# Patient Record
Sex: MALE | Race: WHITE | Employment: FULL TIME | URBAN - METROPOLITAN AREA
[De-identification: names, ages, dates, MRNs, and addresses within clinical notes are randomized per-mention and may not be internally consistent; named-entity substitution may affect disease eponyms.]

---

## 2017-01-04 ENCOUNTER — OFFICE VISIT (OUTPATIENT)
Dept: DERMATOLOGY | Age: 47
End: 2017-01-04

## 2017-01-04 DIAGNOSIS — L71.9 ACNE ROSACEA: Primary | ICD-10-CM

## 2017-01-04 DIAGNOSIS — L30.8 OTHER ECZEMA: ICD-10-CM

## 2017-01-04 PROCEDURE — 99213 OFFICE O/P EST LOW 20 MIN: CPT | Performed by: DERMATOLOGY

## 2017-01-04 RX ORDER — METRONIDAZOLE 7.5 MG/G
GEL TOPICAL
Qty: 60 G | Refills: 6 | Status: SHIPPED | OUTPATIENT
Start: 2017-01-04 | End: 2017-11-13 | Stop reason: SDUPTHER

## 2017-03-08 ENCOUNTER — OFFICE VISIT (OUTPATIENT)
Dept: DERMATOLOGY | Age: 47
End: 2017-03-08

## 2017-03-08 DIAGNOSIS — L71.9 ACNE ROSACEA: Primary | ICD-10-CM

## 2017-03-08 PROCEDURE — 99213 OFFICE O/P EST LOW 20 MIN: CPT | Performed by: DERMATOLOGY

## 2017-03-08 RX ORDER — DOXYCYCLINE 100 MG/1
TABLET ORAL
Qty: 60 TABLET | Refills: 3 | Status: SHIPPED | OUTPATIENT
Start: 2017-03-08 | End: 2017-06-21 | Stop reason: SDUPTHER

## 2017-05-08 ENCOUNTER — TELEPHONE (OUTPATIENT)
Dept: DERMATOLOGY | Age: 47
End: 2017-05-08

## 2017-06-21 ENCOUNTER — OFFICE VISIT (OUTPATIENT)
Dept: DERMATOLOGY | Age: 47
End: 2017-06-21

## 2017-06-21 DIAGNOSIS — L30.8 OTHER ECZEMA: ICD-10-CM

## 2017-06-21 DIAGNOSIS — L71.9 ACNE ROSACEA: Primary | ICD-10-CM

## 2017-06-21 PROCEDURE — 99213 OFFICE O/P EST LOW 20 MIN: CPT | Performed by: DERMATOLOGY

## 2017-06-21 RX ORDER — DOXYCYCLINE 100 MG/1
TABLET ORAL
Qty: 60 TABLET | Refills: 3 | Status: SHIPPED | OUTPATIENT
Start: 2017-06-21 | End: 2017-11-13 | Stop reason: SDUPTHER

## 2017-07-17 ENCOUNTER — PATIENT MESSAGE (OUTPATIENT)
Dept: FAMILY MEDICINE CLINIC | Age: 47
End: 2017-07-17

## 2017-07-17 DIAGNOSIS — R89.4 POSITIVE TEST FOR HERPES SIMPLEX VIRUS (HSV) ANTIBODY: ICD-10-CM

## 2017-07-18 RX ORDER — VALACYCLOVIR HYDROCHLORIDE 500 MG/1
TABLET, FILM COATED ORAL
Qty: 90 TABLET | Refills: 3 | Status: SHIPPED | OUTPATIENT
Start: 2017-07-18 | End: 2018-06-20 | Stop reason: SDUPTHER

## 2017-11-13 ENCOUNTER — OFFICE VISIT (OUTPATIENT)
Dept: DERMATOLOGY | Age: 47
End: 2017-11-13

## 2017-11-13 DIAGNOSIS — L71.9 ACNE ROSACEA: Primary | ICD-10-CM

## 2017-11-13 PROCEDURE — 99213 OFFICE O/P EST LOW 20 MIN: CPT | Performed by: DERMATOLOGY

## 2017-11-13 RX ORDER — METRONIDAZOLE 7.5 MG/G
GEL TOPICAL
Qty: 60 G | Refills: 6 | Status: SHIPPED | OUTPATIENT
Start: 2017-11-13 | End: 2018-11-14 | Stop reason: SDUPTHER

## 2017-11-13 RX ORDER — DOXYCYCLINE 100 MG/1
TABLET ORAL
Qty: 60 TABLET | Refills: 3 | Status: SHIPPED | OUTPATIENT
Start: 2017-11-13 | End: 2018-02-27 | Stop reason: SDUPTHER

## 2017-11-13 NOTE — PROGRESS NOTES
NAD  Well-developed well-nourished. Few inflammatory papules on his chin with background xerosis. Remainder of his face clear      Assessment and Plan     1. Acne rosacea -Doing well-continue doxycycline 100 mg p.o. q. day and MetroGel 0.75% b.i.d. Will plan to see him back in 6 months. If he wants to try reducing his dose prior to that appointment, he is welcome to do so. He notes that inflammatory papules on his chin usually flare after he shaves and after he's been running outside and developed xerosis from the weather- seem to be unrelated to rosacea flares.

## 2018-02-27 RX ORDER — DOXYCYCLINE 100 MG/1
TABLET ORAL
Qty: 60 TABLET | Refills: 3 | Status: SHIPPED | OUTPATIENT
Start: 2018-02-27 | End: 2018-11-14

## 2018-05-14 ENCOUNTER — OFFICE VISIT (OUTPATIENT)
Dept: DERMATOLOGY | Age: 48
End: 2018-05-14

## 2018-05-14 DIAGNOSIS — L71.9 ACNE ROSACEA: Primary | ICD-10-CM

## 2018-05-14 PROCEDURE — 99213 OFFICE O/P EST LOW 20 MIN: CPT | Performed by: DERMATOLOGY

## 2018-06-20 DIAGNOSIS — R89.4 POSITIVE TEST FOR HERPES SIMPLEX VIRUS (HSV) ANTIBODY: ICD-10-CM

## 2018-06-22 RX ORDER — VALACYCLOVIR HYDROCHLORIDE 500 MG/1
TABLET, FILM COATED ORAL
Qty: 90 TABLET | Refills: 0 | Status: SHIPPED | OUTPATIENT
Start: 2018-06-22 | End: 2018-09-13 | Stop reason: SDUPTHER

## 2018-09-15 DIAGNOSIS — Z13.220 LIPID SCREENING: ICD-10-CM

## 2018-09-15 DIAGNOSIS — R89.4 POSITIVE TEST FOR HERPES SIMPLEX VIRUS (HSV) ANTIBODY: Primary | ICD-10-CM

## 2018-11-14 ENCOUNTER — OFFICE VISIT (OUTPATIENT)
Dept: DERMATOLOGY | Age: 48
End: 2018-11-14
Payer: COMMERCIAL

## 2018-11-14 DIAGNOSIS — D22.9 BENIGN NEVUS: ICD-10-CM

## 2018-11-14 DIAGNOSIS — L30.8 OTHER ECZEMA: ICD-10-CM

## 2018-11-14 DIAGNOSIS — L71.9 ACNE ROSACEA: Primary | ICD-10-CM

## 2018-11-14 PROCEDURE — 99214 OFFICE O/P EST MOD 30 MIN: CPT | Performed by: DERMATOLOGY

## 2018-11-14 RX ORDER — METRONIDAZOLE 7.5 MG/G
GEL TOPICAL
Qty: 60 G | Refills: 11 | Status: SHIPPED | OUTPATIENT
Start: 2018-11-14 | End: 2019-06-05

## 2018-11-14 RX ORDER — DOXYCYCLINE 50 MG/1
TABLET ORAL
Qty: 60 TABLET | Refills: 11 | Status: SHIPPED | OUTPATIENT
Start: 2018-11-14 | End: 2020-05-11

## 2018-12-05 DIAGNOSIS — R89.4 POSITIVE TEST FOR HERPES SIMPLEX VIRUS (HSV) ANTIBODY: ICD-10-CM

## 2018-12-06 RX ORDER — VALACYCLOVIR HYDROCHLORIDE 500 MG/1
TABLET, FILM COATED ORAL
Qty: 90 TABLET | Refills: 0 | Status: SHIPPED | OUTPATIENT
Start: 2018-12-06 | End: 2019-03-24 | Stop reason: SDUPTHER

## 2019-03-24 DIAGNOSIS — R89.4 POSITIVE TEST FOR HERPES SIMPLEX VIRUS (HSV) ANTIBODY: ICD-10-CM

## 2019-03-25 RX ORDER — VALACYCLOVIR HYDROCHLORIDE 500 MG/1
TABLET, FILM COATED ORAL
Qty: 30 TABLET | Refills: 0 | Status: SHIPPED | OUTPATIENT
Start: 2019-03-25 | End: 2019-05-24 | Stop reason: SDUPTHER

## 2019-05-24 DIAGNOSIS — R89.4 POSITIVE TEST FOR HERPES SIMPLEX VIRUS (HSV) ANTIBODY: ICD-10-CM

## 2019-05-24 RX ORDER — VALACYCLOVIR HYDROCHLORIDE 500 MG/1
TABLET, FILM COATED ORAL
Qty: 30 TABLET | Refills: 5 | Status: SHIPPED | OUTPATIENT
Start: 2019-05-24 | End: 2019-06-05 | Stop reason: SDUPTHER

## 2019-06-05 ENCOUNTER — OFFICE VISIT (OUTPATIENT)
Dept: FAMILY MEDICINE CLINIC | Age: 49
End: 2019-06-05
Payer: COMMERCIAL

## 2019-06-05 VITALS
OXYGEN SATURATION: 97 % | HEIGHT: 71 IN | HEART RATE: 74 BPM | WEIGHT: 171.8 LBS | RESPIRATION RATE: 19 BRPM | TEMPERATURE: 98.2 F | BODY MASS INDEX: 24.05 KG/M2 | DIASTOLIC BLOOD PRESSURE: 73 MMHG | SYSTOLIC BLOOD PRESSURE: 113 MMHG

## 2019-06-05 DIAGNOSIS — Z00.00 ROUTINE GENERAL MEDICAL EXAMINATION AT A HEALTH CARE FACILITY: Primary | ICD-10-CM

## 2019-06-05 DIAGNOSIS — R89.4 POSITIVE TEST FOR HERPES SIMPLEX VIRUS (HSV) ANTIBODY: ICD-10-CM

## 2019-06-05 PROCEDURE — 99396 PREV VISIT EST AGE 40-64: CPT | Performed by: FAMILY MEDICINE

## 2019-06-05 RX ORDER — VALACYCLOVIR HYDROCHLORIDE 500 MG/1
TABLET, FILM COATED ORAL
Qty: 90 TABLET | Refills: 3 | Status: SHIPPED | OUTPATIENT
Start: 2019-06-05 | End: 2020-05-11 | Stop reason: SDUPTHER

## 2019-06-05 RX ORDER — OMEGA-3/DHA/EPA/FISH OIL 500-1000MG
1 CAPSULE ORAL DAILY
Qty: 90 CAPSULE | Refills: 3 | COMMUNITY
Start: 2019-06-05

## 2019-06-05 NOTE — PATIENT INSTRUCTIONS
1) Make a point to f/u with your dermatologist later this year. 2) Keep up your fitness! 3) Let your PCP know if you have done an HIV screening. Review your [employer] account to update this along with your tetanus booster status. 4) You can call [employer] before your next birthday to check scheduling and insurance/cost.  Phone: [phone]  5) Complete your fasting (8-10) bloodwork in next 30 days. Frankalireza  [address] [place] [address], [address]  Phone: [phone] Fax: [phone]  Mon.-Fri. 7 a.m.-5 p.m. Sat. 8 a.m.-Noon    [facility]  [address]  [facility] Center[facility] da[facility] 70  Phone: [phone]  Mon.-Fri. 7:30 a.m.-4 p.m. 1418 [facility]  [address], 6500 [facility] [address]  Phone: [phone]  Mon.-Fri. 8 a.m.-8 p.m. Sat. 9 a.m.-5 p.m. 6900 [facility]  [address]  [facility]. [address]  Phone: [phone] Fax: [phone]  Mon.-Fri. 8 a.m.-5 p.m. [facility]  [address] [place] [address]  Phone: [phone] Fax: [phone]  Mon.-Fri. 7:30 a.m.-4 p.m. [facility]  555 [facility], [address] [place] [address], [address] [place] [address]  Phone: [phone]  Mon., Tue., Thu., Fri. 7:30 a.m.-5 p.m.  Eunice Solis. 7:30 a.m.-Noon    [facility]  [address] [facility] [address]  [address]  Phone: [phone] Fax: [phone]  Mon.-Fri. 7:30 a.m.-4 p.m. [facility]  [address]  Phone: [phone] Fax: [phone]  Mon.-Fri. 8 a.m.-4:30 p.m. 506 [facility]  [place] [address], 314 [facility], [address] [place] [address]  Phone: [phone] Fax: [phone]  Mon.-Fri. 8 a.m.-4:30 p.m.         1315 [facility]  [address] [place] [address]  [place], [address]  Phone: [phone] Fax: [phone]  Mon.-Fri. 7 a.m.-5 p.m. Sat. 8 a.m.-4 p.m. 800 [facility], [address] [facility] [address]  Phone: [phone]  Mon.-Fri. 7:30 a.m.-4 p.m. [address] [facility] [address] [facility] [address]  1139 [facility]  Phone: [phone] Fax: [phone]  Mon.-Fri. 7 a.m.-5 p.m. Sat. 8 a.m.-Noon [facility] [address], [address] [place] [address]  Phone: [phone] Fax: [phone]  Mon.-Fri. 7 a.m.-5 p.m. [facility]  315 [facility], 400 [facility]  Phone: [phone] Fax: [phone]  Mon.-Fri. 7 a.m.-5 p.m. [address]  [address]  Phone: [phone]  Mon.-Fri. 6:30 a.m.-6 p.m. Sat. 7 a.m.-1 p.m. Critical access hospital 75, [place], [facility]  Phone: [phone]  [facility] 24/7    [address], [address]  Phone: [phone]  Mon.-Fri. 6 a.m.-7 p.m. Sat. 7 a.m.-3 p.m. [facility]  [address] [facility]  Phone: [phone]  Mon.-Fri. 6 a.m.-11 p.m.  Sat.-Sun. 6:30 a.m.-11 p.m. Gaetano Ramirez and Deshaun Claros  [address] [place] [address]  Phone: [phone]  Mon.-Fri. 8 a.m.-4 p.m. 328 [facility]  Farzad Mcdaniel [address]47. [facility], [facility]  Phone: [phone]  Open 24/7    [facility]  [address], [address] [place] [facility]  Phone: [phone]  Mon.-Fri. 6:30 a.m.-6:30 p.m. Sat. 8 a.m.-Noon    1301 [facility] Freddy Mckee [facility]  Phone: [phone] Fax: [phone]  Mon.-Fri. 8:30 a.m.-5 p.m. [facility]   [place] [address], [address] [place] [address]  Phone: [phone] Fax: [phone]  Mon.-Fri. 8 a.m.-4:30 p.m. Please call ahead to check hours.

## 2019-06-05 NOTE — PROGRESS NOTES
[facility]  Progress Note  Manny Yu DO          Zeina Ceballos  1970 06/05/19    Chief Complaint:   Zeina Ceballos is a 51 y.o. male who is here for his yearly check-up    HPI:   The patient has been in otherwise good general health in the past.  Takes valacyclovir preventively. Has brief GI bug and getting better from this. His appetite is picking up today. Is taking omega-3 for the low HDL on a by biometric screen of an HDL [facility] but the lab in 2016 showing an HDL of 50s. ROS negative for headache, vision changes, chest pain, shortness of breath, abdominal pain, urinary sx, bowel changes. Past medical, surgical, and social history reviewed. Medications and allergies reviewed. No Known Allergies  Prior to Visit Medications    Medication Sig Taking? Authorizing Provider   valACYclovir (VALTREX) 500 MG tablet TAKE 1 TABLET BY MOUTH DAILY FOR VIRAL SUPPRESSION AND INCREASE TO 2 TABLETS BY MOUTH TWICE DAILY FOR 5 DAYS DURING OUTBREAK Yes Americo Mtz,    Omega-3 Fatty Acids (OMEGA 3 500) 500 MG CAPS Take 1 each by mouth daily Yes Larry Mtz DO   doxycycline (ADOXA) 50 MG tablet 2 tabs PO QD Yes Cydney Ford MD          Vitals:    06/05/19 0945   BP: 113/73   Pulse: 74   Resp: 19   Temp: 98.2 °F (36.8 °C)   TempSrc: Oral   SpO2: 97%   Weight: 171 lb 12.8 oz (77.9 kg)   Height: 5' 11\" (1.803 m)      Wt Readings from Last 3 Encounters:   06/05/19 171 lb 12.8 oz (77.9 kg)   06/20/16 174 lb 6.4 oz (79.1 kg)     BP Readings from Last 3 Encounters:   06/05/19 113/73   06/20/16 116/68       Patient Active Problem List   Diagnosis    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 No past surgical history on file.   Family History   Problem Relation Age of Onset    Heart Disease Mother         62s    Breast Cancer Mother     Diabetes Father     Heart Disease Father         62s    Kidney Disease Father         dialysis. [place]    Ulcerative Colitis Father         in mid 46s     Social History     Socioeconomic History    Marital status: Single     Spouse name: Not on file    Number of children: Not on file    Years of education: Not on file    Highest education level: Not on file   Occupational History    Not on file   Social Needs    Financial resource strain: Not on file    Food insecurity:     Worry: Not on file     Inability: Not on file    Transportation needs:     Medical: Not on file     Non-medical: Not on file   Tobacco Use    Smoking status: Never Smoker    Smokeless tobacco: Never Used   Substance and Sexual Activity    Alcohol use: No    Drug use: No    Sexual activity: Not on file   Lifestyle    Physical activity:     Days per week: Not on file     Minutes per session: Not on file    Stress: Not on file   Relationships    Social connections:     Talks on phone: Not on file     Gets together: Not on file     Attends Synagogue service: Not on file     Active member of club or organization: Not on file     Attends meetings of clubs or organizations: Not on file     Relationship status: Not on file    Intimate partner violence:     Fear of current or ex partner: Not on file     Emotionally abused: Not on file     Physically abused: Not on file     Forced sexual activity: Not on file   Other Topics Concern    Not on file   Social History Narrative    Not on file       O: /73   Pulse 74   Temp 98.2 °F (36.8 °C) (Oral)   Resp 19   Ht 5' 11\" (1.803 m)   Wt 171 lb 12.8 oz (77.9 kg)   SpO2 97%   BMI 23.96 kg/m²   Physical Exam  GEN: No acute distress, cooperative, well nourished, alert. HEENT: PEERLA, EOMI , normocephalic/atraumatic, nares and oropharynx clear.   Mucus membranes normal, Tympanic membranes clear bilaterally. Neck: soft, supple, no thyromegaly, mass, no Lymphadenopathy  CV: Regular rate and rhythm, no murmur, rubs, gallops. No edema. Resp: Clear to auscultation bilaterally good air entry bilaterally  No crackles, wheeze. Breathing comfortably. Psych: normal affect. Neuro: AOx3  Abd: NTTP    ASSESSMENT   Diagnosis Orders   1. Routine general medical examination at a health care facility  COMPREHENSIVE METABOLIC PANEL    LIPID PANEL    Omega-3 Fatty Acids (OMEGA 3 500) 500 MG CAPS   2. 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 PLAN          See rest of plan under patient instructions. Return in about 1 year (around 6/5/2020) for Wellness/Health Maintenance. Patient Instructions   1) Make a point to f/u with your dermatologist later this year. 2) Keep up your fitness! 3) Let your PCP know if you have done an HIV screening. Review your [employer] account to update this along with your tetanus booster status. 4) You can call [employer] before your next birthday to check scheduling and insurance/cost.  Phone: [phone]  5) Complete your fasting (8-10) bloodwork in next 30 days. Chandra  [address], [address] [facility]  Phone: [phone] Fax: [phone]  Mon.-Fri. 7 a.m.-5 p.m. Sat. 8 a.m.-Noon    [place] [address] [facility] [address]  Phone: [phone]  Mon.-Fri. 7:30 a.m.-4 p.m. 1418 [facility]  [address], 77[address] [facility] [address]  Phone: [phone]  Mon.-Fri. 8 a.m.-8 p.m. Sat. 9 a.m.-5 p.m. 6908 [facility] [address]. [address]  Phone: [phone] Fax: [phone]  Mon.-Fri. 8 a.m.-5 p.m.     5330 [facility] [address] [place] [address]  Phone: [phone]  Mon.-Fri. 6 a.m.-11 p.m.  Sat.-Sun. 6:30 a.m.-11 p.m. [address] and Deshaun Harlan  [address] [place] [address]  Phone: [phone]  Mon.-Fri. 8 a.m.-4 p.m. [address] [place] [address]. [address]  Phone: [phone]  [facility] 24/7    [facility]  [facility] [address], [address] [place] [address]  Phone: [phone]  Mon.-Fri. 6:30 a.m.-6:30 p.m. Sat. 8 a.m.-Noon    1301 [facility] Freddy Mckee [facility]  Phone: [phone] Fax: [phone]  Mon.-Fri. 8:30 a.m.-5 p.m. [address]  Phone: [phone] Fax: [phone]  Mon.-Fri. 8 a.m.-4:30 p.m. Please call ahead to check hours. Please note a portion of this chart was generated using dragon dictation software. Although every effort was made to ensure the accuracy of this automated transcription, some errors in transcription may have occurred.

## 2019-06-20 DIAGNOSIS — Z00.00 ROUTINE GENERAL MEDICAL EXAMINATION AT A HEALTH CARE FACILITY: ICD-10-CM

## 2019-06-21 LAB
A/G RATIO: 1.5 (ref 1.1–2.2)
ALBUMIN SERPL-MCNC: 4.3 G/DL (ref 3.4–5)
ALP BLD-CCNC: 112 U/L (ref 40–129)
ALT SERPL-CCNC: 25 U/L (ref 10–40)
ANION GAP SERPL CALCULATED.3IONS-SCNC: 12 MMOL/L (ref 3–16)
AST SERPL-CCNC: 31 U/L (ref 15–37)
BILIRUB SERPL-MCNC: 0.4 MG/DL (ref 0–1)
BUN BLDV-MCNC: 12 MG/DL (ref 7–20)
CALCIUM SERPL-MCNC: 9.4 MG/DL (ref 8.3–10.6)
CHLORIDE BLD-SCNC: 104 MMOL/L (ref 99–110)
CHOLESTEROL, TOTAL: 143 MG/DL (ref 0–199)
CO2: 27 MMOL/L (ref 21–32)
CREAT SERPL-MCNC: 0.8 MG/DL (ref 0.9–1.3)
GFR AFRICAN AMERICAN: >60
GFR NON-AFRICAN AMERICAN: >60
GLOBULIN: 2.8 G/DL
GLUCOSE BLD-MCNC: 98 MG/DL (ref 70–99)
HDLC SERPL-MCNC: 39 MG/DL (ref 40–60)
LDL CHOLESTEROL CALCULATED: 93 MG/DL
POTASSIUM SERPL-SCNC: 4.8 MMOL/L (ref 3.5–5.1)
SODIUM BLD-SCNC: 143 MMOL/L (ref 136–145)
TOTAL PROTEIN: 7.1 G/DL (ref 6.4–8.2)
TRIGL SERPL-MCNC: 56 MG/DL (ref 0–150)
VLDLC SERPL CALC-MCNC: 11 MG/DL

## 2019-10-03 ENCOUNTER — OFFICE VISIT (OUTPATIENT)
Dept: FAMILY MEDICINE CLINIC | Age: 49
End: 2019-10-03
Payer: COMMERCIAL

## 2019-10-03 VITALS
DIASTOLIC BLOOD PRESSURE: 70 MMHG | WEIGHT: 177 LBS | SYSTOLIC BLOOD PRESSURE: 110 MMHG | OXYGEN SATURATION: 97 % | HEIGHT: 71 IN | BODY MASS INDEX: 24.78 KG/M2 | TEMPERATURE: 98 F | HEART RATE: 65 BPM

## 2019-10-03 DIAGNOSIS — H93.11 TINNITUS OF RIGHT EAR: Primary | ICD-10-CM

## 2019-10-03 PROCEDURE — 99213 OFFICE O/P EST LOW 20 MIN: CPT | Performed by: FAMILY MEDICINE

## 2019-10-03 ASSESSMENT — PATIENT HEALTH QUESTIONNAIRE - PHQ9
SUM OF ALL RESPONSES TO PHQ9 QUESTIONS 1 & 2: 0
2. FEELING DOWN, DEPRESSED OR HOPELESS: 0
SUM OF ALL RESPONSES TO PHQ QUESTIONS 1-9: 0
SUM OF ALL RESPONSES TO PHQ QUESTIONS 1-9: 0
1. LITTLE INTEREST OR PLEASURE IN DOING THINGS: 0

## 2019-10-04 ENCOUNTER — OFFICE VISIT (OUTPATIENT)
Dept: ENT CLINIC | Age: 49
End: 2019-10-04
Payer: COMMERCIAL

## 2019-10-04 VITALS
WEIGHT: 177 LBS | DIASTOLIC BLOOD PRESSURE: 75 MMHG | SYSTOLIC BLOOD PRESSURE: 118 MMHG | HEART RATE: 62 BPM | TEMPERATURE: 62 F | HEIGHT: 71 IN | BODY MASS INDEX: 24.78 KG/M2

## 2019-10-04 DIAGNOSIS — H93.11 TINNITUS OF RIGHT EAR: ICD-10-CM

## 2019-10-04 PROCEDURE — 99203 OFFICE O/P NEW LOW 30 MIN: CPT | Performed by: OTOLARYNGOLOGY

## 2019-10-04 ASSESSMENT — ENCOUNTER SYMPTOMS
SINUS PRESSURE: 0
SORE THROAT: 0
PHOTOPHOBIA: 0
COUGH: 0
BLOOD IN STOOL: 0
NAUSEA: 0
COLOR CHANGE: 0
CONSTIPATION: 0
CHOKING: 0
FACIAL SWELLING: 0
SINUS PAIN: 0
EYE DISCHARGE: 0
BACK PAIN: 0
WHEEZING: 0
RHINORRHEA: 0
SHORTNESS OF BREATH: 0
VOMITING: 0
EYE ITCHING: 0
TROUBLE SWALLOWING: 0
VOICE CHANGE: 0
STRIDOR: 0
DIARRHEA: 0

## 2019-10-10 ENCOUNTER — PROCEDURE VISIT (OUTPATIENT)
Dept: AUDIOLOGY | Age: 49
End: 2019-10-10
Payer: COMMERCIAL

## 2019-10-10 DIAGNOSIS — H93.11 TINNITUS, RIGHT EAR: ICD-10-CM

## 2019-10-10 DIAGNOSIS — H90.41 SENSORINEURAL HEARING LOSS (SNHL) OF RIGHT EAR WITH UNRESTRICTED HEARING OF LEFT EAR: Primary | ICD-10-CM

## 2019-10-10 DIAGNOSIS — H93.8X1 EAR PRESSURE, RIGHT: ICD-10-CM

## 2019-10-10 PROCEDURE — 92557 COMPREHENSIVE HEARING TEST: CPT | Performed by: AUDIOLOGIST

## 2019-10-10 PROCEDURE — 92550 TYMPANOMETRY & REFLEX THRESH: CPT | Performed by: AUDIOLOGIST

## 2019-10-15 ENCOUNTER — TELEPHONE (OUTPATIENT)
Dept: ENT CLINIC | Age: 49
End: 2019-10-15

## 2019-10-18 ENCOUNTER — TELEPHONE (OUTPATIENT)
Dept: ENT CLINIC | Age: 49
End: 2019-10-18

## 2019-11-06 ENCOUNTER — HOSPITAL ENCOUNTER (OUTPATIENT)
Dept: MRI IMAGING | Age: 49
Discharge: HOME | End: 2019-11-06
Payer: COMMERCIAL

## 2019-11-06 PROCEDURE — A9579 GAD-BASE MR CONTRAST NOS,1ML: HCPCS | Performed by: FAMILY MEDICINE

## 2019-11-06 PROCEDURE — 70553 MRI BRAIN STEM W/O & W/DYE: CPT

## 2019-11-06 PROCEDURE — 6360000004 HC RX CONTRAST MEDICATION: Performed by: FAMILY MEDICINE

## 2019-11-06 RX ADMIN — GADOTERIDOL 15 ML: 279.3 INJECTION, SOLUTION INTRAVENOUS at 12:30

## 2019-11-13 ENCOUNTER — TELEPHONE (OUTPATIENT)
Dept: ENT CLINIC | Age: 49
End: 2019-11-13

## 2020-03-13 ENCOUNTER — TELEPHONE (OUTPATIENT)
Dept: ENT CLINIC | Age: 50
End: 2020-03-13

## 2020-03-13 NOTE — TELEPHONE ENCOUNTER
----- Message from Robbi Covington sent at 11/13/2019  3:35 PM EST -----  Reminder patient that it's time for his hearing test (3-6 months)    Last Audiogram 10-10-19

## 2020-03-13 NOTE — TELEPHONE ENCOUNTER
Spoke with patient set an appointment for hearing test first (3-31 @ 9:00)  OV with Dr. Benito Maddox @ 9:30

## 2020-05-07 ENCOUNTER — PATIENT MESSAGE (OUTPATIENT)
Dept: FAMILY MEDICINE CLINIC | Age: 50
End: 2020-05-07

## 2020-05-11 ENCOUNTER — VIRTUAL VISIT (OUTPATIENT)
Dept: DERMATOLOGY | Age: 50
End: 2020-05-11
Payer: COMMERCIAL

## 2020-05-11 PROCEDURE — 99213 OFFICE O/P EST LOW 20 MIN: CPT | Performed by: DERMATOLOGY

## 2020-05-11 RX ORDER — DOXYCYCLINE HYCLATE 100 MG
TABLET ORAL
Qty: 30 TABLET | Refills: 11 | Status: SHIPPED | OUTPATIENT
Start: 2020-05-11

## 2020-05-11 RX ORDER — VALACYCLOVIR HYDROCHLORIDE 500 MG/1
TABLET, FILM COATED ORAL
Qty: 90 TABLET | Refills: 3 | Status: SHIPPED | OUTPATIENT
Start: 2020-05-11 | End: 2021-09-20

## 2020-05-11 NOTE — PROGRESS NOTES
Sherryle Balloon is a 47 y.o. male being evaluated by a Virtual Visit (video visit) encounter to address concerns as mentioned above. A caregiver was present when appropriate. Due to this being a TeleHealth encounter (During [facility]-21 public health emergency), evaluation of the following organ systems was limited: Vitals/Constitutional/EENT/Resp/CV/GI//MS/Neuro/Skin/Heme-Lymph-Imm. Pursuant to the emergency declaration under the [address] [place] [address] and the [employer] Act, this Virtual Visit was conducted with patient's (and/or legal guardian's) consent, to reduce the patient's risk of exposure to COVID-19 and provide necessary medical care. The patient (and/or legal guardian) has also been advised to contact this office for worsening conditions or problems, and seek emergency medical treatment and/or call 911 if deemed necessary. Patient identification was verified at the start of the visit: yes    Total time spent for this encounter: 5:09 doxy and approx 7 min epic    Services were provided through a video synchronous discussion virtually to substitute for in-person clinic visit. Patient and provider were located at their individual homes. --Catherine Guidry MD on 5/11/2020 at 11:47 AM    An electronic signature was used to authenticate this note. [facility]) Dermatology  Becca Ace M.D.  [phone]       Sherryle Balloon  1970    47 y.o. male     Date of Visit: 5/11/2020    Chief Complaint:   Chief Complaint   Patient presents with    Rosacea     med refill for doxycycline (50mg daily for now), acne rosacea flaring on forehead. No topical meds. I was asked to see this patient by Dr. Ho ref. provider found. History of Present Illness:  1. Patient presents today for follow-up of rosacea. He has been on doxycycline since November 2016.   Has tried multiple times since his last visit to reduce his dose down to either 50 mg daily or 100 mg every other day with a flare every time. He remained stable at 100 mg p.o. daily-rosacea remains clear. Still has photosensitivity and uses sunscreen regularly. Has nausea unless he takes this with food but is able to take it with food regularly. Denies other side effects from doxycycline. Has previously used metronidazole topically, but usually remains clear without metronidazole at doxycycline 100 mg p.o. daily. Skin history:  Eczematous dermatitis right anterior shin-response to betamethasone cream     Rosacea, initially steroid-induced, on doxycycline since November 2016 and MetroGel topically     Clinical pharmacist at [employer]    Review of Systems:  Constitutional: Reports general sense of well-being       Past Medical History, Surgical History, Family History, Medications and Allergies reviewed.     Social History:   Social History     Socioeconomic History    Marital status: Single     Spouse name: Not on file    Number of children: Not on file    Years of education: Not on file    Highest education level: Not on file   Occupational History    Not on file   Social Needs    Financial resource strain: Not on file    Food insecurity     Worry: Not on file     Inability: Not on file    Transportation needs     Medical: Not on file     Non-medical: Not on file   Tobacco Use    Smoking status: Never Smoker    Smokeless tobacco: Never Used   Substance and Sexual Activity    Alcohol use: No    Drug use: No    Sexual activity: Not on file   Lifestyle    Physical activity     Days per week: Not on file     Minutes per session: Not on file    Stress: Not on file   Relationships    Social connections     Talks on phone: Not on file     Gets together: Not on file     Attends Orthodox service: Not on file     Active member of club or organization: Not on file     Attends meetings of clubs or organizations: Not on file     Relationship status: Not on file    Intimate partner violence     Fear of current or ex partner: Not on file     Emotionally abused: Not on file     Physically abused: Not on file     Forced sexual activity: Not on file   Other Topics Concern    Not on file   Social History Narrative    Not on file       Physical Examination       -General: Well-appearing, NAD  1. Well-developed well-nourished. No active rosacea today      Assessment and Plan     1. Rosacea -continue doxycycline 100 mg p.o. daily. Remain off metronidazole as it does not seem to add additional improvement when he remains at his current dose of doxycycline. Continue photoprotection and eating when he takes his dose.   Follow-up 1 year sooner if needed

## 2020-05-27 ENCOUNTER — OFFICE VISIT (OUTPATIENT)
Dept: ENT CLINIC | Age: 50
End: 2020-05-27
Payer: COMMERCIAL

## 2020-05-27 ENCOUNTER — PROCEDURE VISIT (OUTPATIENT)
Dept: AUDIOLOGY | Age: 50
End: 2020-05-27
Payer: COMMERCIAL

## 2020-05-27 VITALS
WEIGHT: 184 LBS | HEIGHT: 71 IN | SYSTOLIC BLOOD PRESSURE: 123 MMHG | TEMPERATURE: 97.7 F | DIASTOLIC BLOOD PRESSURE: 76 MMHG | BODY MASS INDEX: 25.76 KG/M2 | HEART RATE: 63 BPM

## 2020-05-27 PROCEDURE — 92557 COMPREHENSIVE HEARING TEST: CPT | Performed by: AUDIOLOGIST

## 2020-05-27 PROCEDURE — 99213 OFFICE O/P EST LOW 20 MIN: CPT | Performed by: OTOLARYNGOLOGY

## 2020-05-27 PROCEDURE — 92567 TYMPANOMETRY: CPT | Performed by: AUDIOLOGIST

## 2020-05-27 ASSESSMENT — ENCOUNTER SYMPTOMS
EYE ITCHING: 0
VOICE CHANGE: 0
EYE REDNESS: 0
CHOKING: 0
DIARRHEA: 0
SORE THROAT: 0
SINUS PRESSURE: 0
EYE PAIN: 0
SINUS PAIN: 0
COUGH: 0
STRIDOR: 0
FACIAL SWELLING: 0
SHORTNESS OF BREATH: 0
NAUSEA: 0
RHINORRHEA: 0
COLOR CHANGE: 0
PHOTOPHOBIA: 0
TROUBLE SWALLOWING: 0

## 2020-05-27 NOTE — PROGRESS NOTES
and stridor. Gastrointestinal: Negative for diarrhea and nausea. Musculoskeletal: Negative for neck pain and neck stiffness. Skin: Negative for color change and rash. Neurological: Negative for dizziness, facial asymmetry and light-headedness. Hematological: Negative for adenopathy. Psychiatric/Behavioral: Negative for agitation and confusion. PhysicalExam     Vitals:    05/27/20 1337   BP: 123/76   Pulse: 63   Temp: 97.7 °F (36.5 °C)       Physical Exam  Constitutional:       Appearance: He is well-developed. HENT:      Head: Normocephalic and atraumatic. Jaw: No trismus. Right Ear: Tympanic membrane, ear canal and external ear normal. No drainage. No middle ear effusion. Tympanic membrane is not perforated. Left Ear: Tympanic membrane, ear canal and external ear normal. No drainage. No middle ear effusion. Tympanic membrane is not perforated. Nose: No septal deviation, mucosal edema or rhinorrhea. Mouth/Throat:      Dentition: Normal dentition. Pharynx: Uvula midline. No oropharyngeal exudate. Eyes:      General: No scleral icterus. Right eye: No discharge. Left eye: No discharge. Pupils: Pupils are equal, round, and reactive to light. Neck:      Musculoskeletal: Neck supple. Thyroid: No thyromegaly. Trachea: Phonation normal. No tracheal deviation. Pulmonary:      Effort: Pulmonary effort is normal. No respiratory distress. Breath sounds: No stridor. Lymphadenopathy:      Cervical: No cervical adenopathy. Skin:     General: Skin is warm and dry. Neurological:      Mental Status: He is alert and oriented to person, place, and time. Cranial Nerves: No cranial nerve deficit. Psychiatric:         Behavior: Behavior normal.           Procedure           Assessment and Plan     1.  Sensorineural hearing loss (SNHL) of both ears  Patient now has bilateral tinnitus and bilateral sensorineural hearing loss in the

## 2021-09-16 DIAGNOSIS — R89.4 POSITIVE TEST FOR HERPES SIMPLEX VIRUS (HSV) ANTIBODY: ICD-10-CM

## 2021-09-20 RX ORDER — VALACYCLOVIR HYDROCHLORIDE 500 MG/1
TABLET, FILM COATED ORAL
Qty: 90 TABLET | Refills: 3 | Status: SHIPPED | OUTPATIENT
Start: 2021-09-20

## 2024-11-04 SDOH — HEALTH STABILITY: PHYSICAL HEALTH: ON AVERAGE, HOW MANY DAYS PER WEEK DO YOU ENGAGE IN MODERATE TO STRENUOUS EXERCISE (LIKE A BRISK WALK)?: 5 DAYS

## 2024-11-04 SDOH — HEALTH STABILITY: PHYSICAL HEALTH: ON AVERAGE, HOW MANY MINUTES DO YOU ENGAGE IN EXERCISE AT THIS LEVEL?: 40 MIN

## 2024-11-07 ENCOUNTER — OFFICE VISIT (OUTPATIENT)
Dept: INTERNAL MEDICINE CLINIC | Age: 54
End: 2024-11-07

## 2024-11-07 VITALS
DIASTOLIC BLOOD PRESSURE: 80 MMHG | HEIGHT: 69 IN | BODY MASS INDEX: 26.66 KG/M2 | SYSTOLIC BLOOD PRESSURE: 130 MMHG | TEMPERATURE: 97.4 F | OXYGEN SATURATION: 99 % | HEART RATE: 59 BPM | WEIGHT: 180 LBS

## 2024-11-07 DIAGNOSIS — Z00.00 ENCOUNTER FOR WELL ADULT EXAM WITHOUT ABNORMAL FINDINGS: Primary | ICD-10-CM

## 2024-11-07 DIAGNOSIS — Z12.11 ENCOUNTER FOR COLORECTAL CANCER SCREENING: ICD-10-CM

## 2024-11-07 DIAGNOSIS — Z00.00 PREVENTATIVE HEALTH CARE: ICD-10-CM

## 2024-11-07 DIAGNOSIS — Z23 NEED FOR TDAP VACCINATION: ICD-10-CM

## 2024-11-07 DIAGNOSIS — Z12.12 ENCOUNTER FOR COLORECTAL CANCER SCREENING: ICD-10-CM

## 2024-11-07 DIAGNOSIS — R89.4 POSITIVE TEST FOR HERPES SIMPLEX VIRUS (HSV) ANTIBODY: ICD-10-CM

## 2024-11-07 RX ORDER — VALACYCLOVIR HYDROCHLORIDE 500 MG/1
500 TABLET, FILM COATED ORAL DAILY
COMMUNITY

## 2024-11-07 RX ORDER — VALACYCLOVIR HYDROCHLORIDE 500 MG/1
500 TABLET, FILM COATED ORAL DAILY
Qty: 90 TABLET | Refills: 3 | Status: SHIPPED | OUTPATIENT
Start: 2024-11-07 | End: 2025-11-02

## 2024-11-07 SDOH — ECONOMIC STABILITY: FOOD INSECURITY: WITHIN THE PAST 12 MONTHS, YOU WORRIED THAT YOUR FOOD WOULD RUN OUT BEFORE YOU GOT MONEY TO BUY MORE.: NEVER TRUE

## 2024-11-07 SDOH — ECONOMIC STABILITY: FOOD INSECURITY: WITHIN THE PAST 12 MONTHS, THE FOOD YOU BOUGHT JUST DIDN'T LAST AND YOU DIDN'T HAVE MONEY TO GET MORE.: NEVER TRUE

## 2024-11-07 SDOH — ECONOMIC STABILITY: INCOME INSECURITY: HOW HARD IS IT FOR YOU TO PAY FOR THE VERY BASICS LIKE FOOD, HOUSING, MEDICAL CARE, AND HEATING?: NOT HARD AT ALL

## 2024-11-07 ASSESSMENT — ENCOUNTER SYMPTOMS
WHEEZING: 0
BLOOD IN STOOL: 0
SORE THROAT: 0
COUGH: 0
VOICE CHANGE: 0
SINUS PAIN: 0
VOMITING: 0
CHEST TIGHTNESS: 0
CONSTIPATION: 0
ABDOMINAL PAIN: 0
BACK PAIN: 0
SHORTNESS OF BREATH: 0
DIARRHEA: 0
TROUBLE SWALLOWING: 0
NAUSEA: 0
ABDOMINAL DISTENTION: 0
SINUS PRESSURE: 0

## 2024-11-07 ASSESSMENT — PATIENT HEALTH QUESTIONNAIRE - PHQ9
SUM OF ALL RESPONSES TO PHQ QUESTIONS 1-9: 0
2. FEELING DOWN, DEPRESSED OR HOPELESS: NOT AT ALL

## 2024-11-07 NOTE — ASSESSMENT & PLAN NOTE
-Stable pattern of outbreaks.  Ordered prophylactic therapy and gave directions for acute outbreaks.     Orders:    valACYclovir (VALTREX) 500 MG tablet; Take 1 tablet by mouth daily TAKE 1 TABLET BY MOUTH EVERY DAY FOR SUPRESSION AND INCREASE TO 2 TABLETS BY MOUTH TWICE DAILY FOR 5 DAYS DURING OUTBREAK

## 2024-11-07 NOTE — PROGRESS NOTES
2024    Elvin Fontaine (:  1970) is a 54 y.o. male, here for an initial preventive medicine evaluation.    The patient is a 54 year old gentleman.  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  He exercises regularly.  He does CrossFit training approximately twice weekly and runs 3 times a week.  He has no special diet.  He eats a diet that is relatively rich in fresh fruits and vegetables and whole grains.  He drinks 2-3 alcoholic drinks weekly, predominantly on weekends.  He is a never smoker.    HSV 1 and 2 positivity  This is a chronic problem, initially diagnosed in .  The patient has 1 genital and 1 oral outbreak annually, typically in the winter.  The patient is sexually active with a monogamous partner.  He is aware that acyclovir therapy on a daily basis dramatically reduces the risk of transmission, but that he should still use condoms to prevent transmission.      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   HENT:  Positive for tinnitus (Chronic, bilatteral, stable.). Negative for sinus pressure, sinus pain, sore throat, trouble swallowing and voice change.    Eyes:  Negative for visual disturbance.   Respiratory:  Negative for cough, chest tightness, shortness of breath and wheezing.    Cardiovascular:  Negative for chest pain, palpitations and leg swelling.   Gastrointestinal:  Negative for abdominal distention, abdominal pain, blood in stool, constipation, diarrhea, nausea and vomiting.   Endocrine: Negative for polydipsia and polyphagia.   Genitourinary:  Negative for decreased urine volume, difficulty urinating, dysuria and

## 2024-12-11 DIAGNOSIS — Z00.00 PREVENTATIVE HEALTH CARE: ICD-10-CM

## 2024-12-11 LAB
ALBUMIN SERPL-MCNC: 4.4 G/DL (ref 3.4–5)
ALBUMIN/GLOB SERPL: 1.6 {RATIO} (ref 1.1–2.2)
ALP SERPL-CCNC: 110 U/L (ref 40–129)
ALT SERPL-CCNC: 26 U/L (ref 10–40)
ANION GAP SERPL CALCULATED.3IONS-SCNC: 11 MMOL/L (ref 3–16)
AST SERPL-CCNC: 37 U/L (ref 15–37)
BILIRUB SERPL-MCNC: 0.6 MG/DL (ref 0–1)
BUN SERPL-MCNC: 18 MG/DL (ref 7–20)
CALCIUM SERPL-MCNC: 9.7 MG/DL (ref 8.3–10.6)
CHLORIDE SERPL-SCNC: 103 MMOL/L (ref 99–110)
CHOLEST SERPL-MCNC: 161 MG/DL (ref 0–199)
CO2 SERPL-SCNC: 25 MMOL/L (ref 21–32)
CREAT SERPL-MCNC: 1 MG/DL (ref 0.9–1.3)
DEPRECATED RDW RBC AUTO: 13.4 % (ref 12.4–15.4)
EST. AVERAGE GLUCOSE BLD GHB EST-MCNC: 108.3 MG/DL
GFR SERPLBLD CREATININE-BSD FMLA CKD-EPI: 89 ML/MIN/{1.73_M2}
GLUCOSE SERPL-MCNC: 90 MG/DL (ref 70–99)
HBA1C MFR BLD: 5.4 %
HCT VFR BLD AUTO: 44 % (ref 40.5–52.5)
HCV AB SERPL QL IA: NORMAL
HDLC SERPL-MCNC: 59 MG/DL (ref 40–60)
HGB BLD-MCNC: 15 G/DL (ref 13.5–17.5)
LDL CHOLESTEROL: 95 MG/DL
MCH RBC QN AUTO: 31.1 PG (ref 26–34)
MCHC RBC AUTO-ENTMCNC: 34 G/DL (ref 31–36)
MCV RBC AUTO: 91.5 FL (ref 80–100)
PLATELET # BLD AUTO: 193 K/UL (ref 135–450)
PMV BLD AUTO: 10.3 FL (ref 5–10.5)
POTASSIUM SERPL-SCNC: 4.8 MMOL/L (ref 3.5–5.1)
PROT SERPL-MCNC: 7.1 G/DL (ref 6.4–8.2)
PSA SERPL DL<=0.01 NG/ML-MCNC: 2.02 NG/ML (ref 0–4)
RBC # BLD AUTO: 4.8 M/UL (ref 4.2–5.9)
SODIUM SERPL-SCNC: 139 MMOL/L (ref 136–145)
TRIGL SERPL-MCNC: 34 MG/DL (ref 0–150)
TSH SERPL DL<=0.005 MIU/L-ACNC: 2.35 UIU/ML (ref 0.27–4.2)
VLDLC SERPL CALC-MCNC: 7 MG/DL
WBC # BLD AUTO: 5.3 K/UL (ref 4–11)

## 2024-12-12 LAB
HIV 1+2 AB+HIV1 P24 AG SERPL QL IA: NORMAL
HIV 2 AB SERPL QL IA: NORMAL
HIV1 AB SERPL QL IA: NORMAL
HIV1 P24 AG SERPL QL IA: NORMAL